# Patient Record
(demographics unavailable — no encounter records)

---

## 2024-12-04 NOTE — RISK ASSESSMENT
[Has anyone in your immediate family (parents, grandparents, siblings) or other more distant relatives (aunts, uncles, cousins)  of heart] : Has anyone in your immediate family (parents, grandparents, siblings) or other more distant relatives (aunts, uncles, cousins)  of heart problems or had an unexpected sudden death before age 50 (This would include unexpected drownings, unexplained car accidents in which the relative was driving or sudden infant death syndrome.)? No [Are you related to anyone with hypertrophic cardiomyopathy or hypertrophic obstructive cardiomyopathy, Marfan syndrome, arrhythmogenic] : Are you related to anyone with hypertrophic cardiomyopathy or hypertrophic obstructive cardiomyopathy, Marfan syndrome, arrhythmogenic right ventricular cardiomyopathy, long QT syndrome, short QT syndrome, Brugada syndrome or catecholaminergic polymorphic ventricular tachycardia, or anyone younger than 50 years with a pacemaker or implantable defibrillator? No [No family history of SCA predisposing conditions] : No family history of SCA predisposing conditions

## 2024-12-04 NOTE — PHYSICAL EXAM
[Alert] : alert [No Acute Distress] : no acute distress [Normocephalic] : normocephalic [Conjunctivae with no discharge] : conjunctivae with no discharge [PERRL] : PERRL [EOMI Bilateral] : EOMI bilateral [Auricles Well Formed] : auricles well formed [Clear Tympanic membranes with present light reflex and bony landmarks] : clear tympanic membranes with present light reflex and bony landmarks [No Discharge] : no discharge [Nares Patent] : nares patent [Pink Nasal Mucosa] : pink nasal mucosa [Palate Intact] : palate intact [Nonerythematous Oropharynx] : nonerythematous oropharynx [Supple, full passive range of motion] : supple, full passive range of motion [No Palpable Masses] : no palpable masses [Symmetric Chest Rise] : symmetric chest rise [Clear to Auscultation Bilaterally] : clear to auscultation bilaterally [Regular Rate and Rhythm] : regular rate and rhythm [Normal S1, S2 present] : normal S1, S2 present [No Murmurs] : no murmurs [+2 Femoral Pulses] : +2 femoral pulses [Soft] : soft [NonTender] : non tender [Non Distended] : non distended [Normoactive Bowel Sounds] : normoactive bowel sounds [No Hepatomegaly] : no hepatomegaly [No Splenomegaly] : no splenomegaly [Talat: _____] : Talat [unfilled] [Circumcised] : circumcised [Testicles Descended Bilaterally] : testicles descended bilaterally [Patent] : patent [No fissures] : no fissures [No Abnormal Lymph Nodes Palpated] : no abnormal lymph nodes palpated [No Gait Asymmetry] : no gait asymmetry [No pain or deformities with palpation of bone, muscles, joints] : no pain or deformities with palpation of bone, muscles, joints [Normal Muscle Tone] : normal muscle tone [Straight] : straight [+2 Patella DTR] : +2 patella DTR [Cranial Nerves Grossly Intact] : cranial nerves grossly intact [No Rash or Lesions] : no rash or lesions [FreeTextEntry6] : well healed horizontal surgical incision

## 2024-12-04 NOTE — DISCUSSION/SUMMARY
[School] : school [Development and Mental Health] : development and mental health [Nutrition and Physical Activity] : nutrition and physical activity [Oral Health] : oral health [Safety] : safety [Mother] : mother [FreeTextEntry1] :  10 year male growing and developing well.  Continue balanced diet with all food groups. Brush teeth twice a day with toothbrush. Recommend visit to dentist. Help child to maintain consistent daily routines and sleep schedule. School discussed. Ensure home is safe. Teach child about personal safety. Use consistent, positive discipline. Limit screen time to no more than 2 hours per day. Encourage physical activity. Child needs to ride in a belt-positioning booster seat until  4 feet 9 inches has been reached and are between 8 and 12 years of age.   Return 1 year for routine well child check.

## 2024-12-04 NOTE — HISTORY OF PRESENT ILLNESS
[Mother] : mother [Fruit] : fruit [Vegetables] : vegetables [Meat] : meat [Dairy] : dairy [Normal] : Normal [Sleeps ___ hours per night] : sleeps [unfilled] hours per night [Brushing teeth twice/d] : brushing teeth twice per day [Yes] : Patient goes to dentist yearly [Toothpaste] : Primary Fluoride Source: Toothpaste [Playtime (60 min/d)] : playtime 60 min a day [Participates in after-school activities] : participates in after-school activities [< 2 hrs of screen time per day] : less than 2 hrs of screen time per day [Grade ___] : Grade [unfilled] [No] : No cigarette smoke exposure [Up to date] : Up to date [FreeTextEntry7] : has billy orchiopexy

## 2025-02-05 NOTE — DISCUSSION/SUMMARY
[FreeTextEntry1] : 10 yo male with likely tension headaches, with possible triggers being screen time, sleep schedule. Visual acuity 20/40 distance.   Recommend keeping headache logs to identify triggers. Discussed need to get closer to 10hours of sleep and minimize screen time (tablets and video games) to keep it under 1hour a day including school time. Refer to Optho

## 2025-02-05 NOTE — HISTORY OF PRESENT ILLNESS
[de-identified] : headache [FreeTextEntry6] : 10 yo p/w headaches for months, no n/v/d, no light sensitivities, no tinnitus, randomly throughout the day. HA do not wake him up from sleep. HA happens weekdays and weekends, sometimes worse on weekends Per patient it has been going on since school started, at first 2x/ week and now 2-4x/week HA lasts 5-10 min and improves on its own. Sometimes with tylenol. Denies any association with foods (meats, chocolate). Patient goes to sleeps ~930pm. PT able to get through school day and other activities.  Sit in the back of the class and sees well, no blurred vision however last eye exam is 20/30, slightly worse than prior. Pt has a lot of screen time at school and 2hr/day at home. Patient has cereal in AM, sometimes at school lunch, and then has snacks at home (pretzels), dinner is protein + rice.  Pt drinks ~30oz of water at school.   No fhx of migraines.

## 2025-02-05 NOTE — PHYSICAL EXAM
[Symmetric Chest Wall] : symmetric chest wall [NL] : normotonic [Normotonic] : normotonic [Tenderness] : no tenderness [Laceration] : no laceration [Ecchymosis] : no ecchymosis [Swelling] : no swelling [Traumatic] : atraumatic [de-identified] : neg FNF, neg romberg, CN grossly intact, no reduced strength, gait wnl